# Patient Record
Sex: MALE | Race: WHITE
[De-identification: names, ages, dates, MRNs, and addresses within clinical notes are randomized per-mention and may not be internally consistent; named-entity substitution may affect disease eponyms.]

---

## 2023-09-19 ENCOUNTER — HOSPITAL ENCOUNTER (EMERGENCY)
Dept: HOSPITAL 46 - ED | Age: 58
Discharge: HOME | End: 2023-09-19
Payer: COMMERCIAL

## 2023-09-19 VITALS — WEIGHT: 152.12 LBS | BODY MASS INDEX: 24.45 KG/M2 | HEIGHT: 66 IN

## 2023-09-19 VITALS — SYSTOLIC BLOOD PRESSURE: 139 MMHG | DIASTOLIC BLOOD PRESSURE: 85 MMHG

## 2023-09-19 DIAGNOSIS — N20.1: Primary | ICD-10-CM

## 2023-09-19 LAB
ALBUMIN SERPL-MCNC: 3.9 G/DL (ref 3.4–5)
ALBUMIN/GLOB SERPL: 1.39 {RATIO} (ref 1.1–2.4)
ALP SERPL-CCNC: 29 U/L (ref 46–116)
ALT SERPL W P-5'-P-CCNC: 20 U/L (ref 14–59)
ANION GAP SERPL CALCULATED.4IONS-SCNC: 10 MMOL/L (ref 7–21)
AST SERPL-CCNC: 15 U/L (ref 15–37)
BASOPHILS NFR BLD AUTO: 0.8 % (ref 0–2)
BUN SERPL-MCNC: 24 MG/DL (ref 7–18)
BUN/CREAT SERPL: 25 (ref 6–28.6)
CALCIUM SERPL-MCNC: 8.9 MG/DL (ref 8.5–10.1)
CHLORIDE SERPL-SCNC: 97 MMOL/L (ref 98–107)
CO2 SERPL-SCNC: 29 MMOL/L (ref 21–32)
DEPRECATED RDW RBC AUTO: 14.5 FL (ref 10.5–15)
EGFRCR SERPLBLD CKD-EPI 2021: 92 ML/MIN (ref 60–?)
EOSINOPHIL NFR BLD AUTO: 0.5 % (ref 0–6)
EPI CELLS #/AREA URNS HPF: (no result) /LPF
GLOBULIN SER-MCNC: 2.8 G/DL (ref 1.8–3.5)
HCT VFR BLD AUTO: 42.5 % (ref 35–50)
HGB BLD-MCNC: 14 G/DL (ref 12–18)
HGB UR QL STRIP: (no result)
KETONES UR QL STRIP: NEGATIVE
LEUKOCYTE ESTERASE UR QL STRIP: NEGATIVE
LYMPHOCYTES NFR BLD AUTO: 17.2 % (ref 24–44)
MCH RBC QN AUTO: 30 PG (ref 27–36)
MCHC RBC AUTO-ENTMCNC: 33 G/DL (ref 30–36)
MCV RBC AUTO: 91 FL (ref 81–99)
MONOCYTES NFR BLD AUTO: 6.5 % (ref 0–12)
NEUTROPHILS NFR BLD AUTO: 75 % (ref 39–80)
NITRITE UR QL STRIP: NEGATIVE
PLATELET # BLD AUTO: 279 K/UL (ref 140–440)
POTASSIUM SERPL-SCNC: 4 MMOL/L (ref 3.5–5.1)
PROT SERPL-MCNC: 6.7 G/DL (ref 6.4–8.2)
RBC # BLD AUTO: 4.67 M/UL (ref 4.3–5.7)

## 2023-09-19 PROCEDURE — A9270 NON-COVERED ITEM OR SERVICE: HCPCS

## 2023-09-22 ENCOUNTER — HOSPITAL ENCOUNTER (EMERGENCY)
Dept: HOSPITAL 46 - ED | Age: 58
Discharge: HOME | End: 2023-09-22
Payer: COMMERCIAL

## 2023-09-22 VITALS — DIASTOLIC BLOOD PRESSURE: 78 MMHG | SYSTOLIC BLOOD PRESSURE: 135 MMHG

## 2023-09-22 VITALS — HEIGHT: 66 IN | BODY MASS INDEX: 23.63 KG/M2 | WEIGHT: 147 LBS

## 2023-09-22 DIAGNOSIS — N13.2: Primary | ICD-10-CM

## 2023-09-22 LAB
ALBUMIN SERPL-MCNC: 3.9 G/DL (ref 3.4–5)
ALBUMIN/GLOB SERPL: 1.15 {RATIO} (ref 1.1–2.4)
ALP SERPL-CCNC: 31 U/L (ref 46–116)
ALT SERPL W P-5'-P-CCNC: 23 U/L (ref 14–59)
ANION GAP SERPL CALCULATED.4IONS-SCNC: 12.2 MMOL/L (ref 7–21)
AST SERPL-CCNC: 17 U/L (ref 15–37)
BASOPHILS NFR BLD AUTO: 0.3 % (ref 0–2)
BUN SERPL-MCNC: 32 MG/DL (ref 7–18)
BUN/CREAT SERPL: 28.82 (ref 6–28.6)
CALCIUM SERPL-MCNC: 9.2 MG/DL (ref 8.5–10.1)
CHLORIDE SERPL-SCNC: 101 MMOL/L (ref 98–107)
CO2 SERPL-SCNC: 26 MMOL/L (ref 21–32)
DEPRECATED RDW RBC AUTO: 14.6 FL (ref 10.5–15)
EGFRCR SERPLBLD CKD-EPI 2021: 77 ML/MIN (ref 60–?)
EOSINOPHIL NFR BLD AUTO: 0 % (ref 0–6)
GLOBULIN SER-MCNC: 3.4 G/DL (ref 1.8–3.5)
HCT VFR BLD AUTO: 42.5 % (ref 35–50)
HGB BLD-MCNC: 14.1 G/DL (ref 12–18)
LYMPHOCYTES NFR BLD AUTO: 5.9 % (ref 24–44)
MCH RBC QN AUTO: 30.2 PG (ref 27–36)
MCHC RBC AUTO-ENTMCNC: 33.1 G/DL (ref 30–36)
MCV RBC AUTO: 91.1 FL (ref 81–99)
MONOCYTES NFR BLD AUTO: 3.1 % (ref 0–12)
NEUTROPHILS NFR BLD AUTO: 90.7 % (ref 39–80)
PLATELET # BLD AUTO: 279 K/UL (ref 140–440)
POTASSIUM SERPL-SCNC: 4.2 MMOL/L (ref 3.5–5.1)
PROT SERPL-MCNC: 7.3 G/DL (ref 6.4–8.2)
RBC # BLD AUTO: 4.66 M/UL (ref 4.3–5.7)

## 2023-09-22 PROCEDURE — A9270 NON-COVERED ITEM OR SERVICE: HCPCS

## 2023-09-22 NOTE — XMS
PreManage Notification: CHRISTINA BARCLAY MRN:[id number]
 
Security Information
 
Security Events
No recent Security Events currently on file
 
 
 
CRITERIA MET
------------
- [facility] - 2 Visits in 30 Days
 
 
CARE PROVIDERS
-------------------------------------------------------------------------------------
Louise ClarkP-C     Nurse Practitioner: Family     Current
 
PHONE: [phone]
-------------------------------------------------------------------------------------
 
Bonnie has no Care Guidelines for this patient.
 
[facility] VISIT COUNT (12 MO.)
-------------------------------------------------------------------------------------
2 [address]
-------------------------------------------------------------------------------------
TOTAL 3
-------------------------------------------------------------------------------------
NOTE: Visits indicate total known visits.
 
ED/UCC VISIT TRACKING (12 MO.)
-------------------------------------------------------------------------------------
09/22/2023 11:33
[facility] [place]
 
TYPE: Emergency
 
COMPLAINT:
- L FLANK PAIN
-------------------------------------------------------------------------------------
09/19/2023 09:39
[facility] [place]
 
TYPE: Emergency
 
COMPLAINT:
- L FLANK PAIN
 
DIAGNOSES:
- Calculus of kidney
- Unspecified abdominal pain
-------------------------------------------------------------------------------------
09/19/2023 08:37
[facility] [place]
 
TYPE: Emergency
 
 
COMPLAINT:
- KIDNEY STONE
 
DIAGNOSES:
- KIDNEY STONE
-------------------------------------------------------------------------------------
 
 
INPATIENT VISIT TRACKING (12 MO.)
No inpatient visits to display in this time frame
 
[email or web address]